# Patient Record
Sex: FEMALE | Race: BLACK OR AFRICAN AMERICAN | NOT HISPANIC OR LATINO | Employment: FULL TIME | URBAN - METROPOLITAN AREA
[De-identification: names, ages, dates, MRNs, and addresses within clinical notes are randomized per-mention and may not be internally consistent; named-entity substitution may affect disease eponyms.]

---

## 2023-11-02 ENCOUNTER — OFFICE VISIT (OUTPATIENT)
Age: 43
End: 2023-11-02
Payer: COMMERCIAL

## 2023-11-02 VITALS — RESPIRATION RATE: 18 BRPM | HEART RATE: 84 BPM | OXYGEN SATURATION: 98 % | WEIGHT: 293 LBS | TEMPERATURE: 99.6 F

## 2023-11-02 DIAGNOSIS — R05.9 COUGH, UNSPECIFIED TYPE: Primary | ICD-10-CM

## 2023-11-02 DIAGNOSIS — J06.9 ACUTE URI: ICD-10-CM

## 2023-11-02 LAB
SARS-COV-2 AG UPPER RESP QL IA: NEGATIVE
VALID CONTROL: NORMAL

## 2023-11-02 PROCEDURE — 87811 SARS-COV-2 COVID19 W/OPTIC: CPT | Performed by: PHYSICIAN ASSISTANT

## 2023-11-02 PROCEDURE — 99204 OFFICE O/P NEW MOD 45 MIN: CPT | Performed by: PHYSICIAN ASSISTANT

## 2023-11-02 RX ORDER — SPIRONOLACTONE 25 MG/1
25 TABLET ORAL DAILY
COMMUNITY
Start: 2023-09-25 | End: 2024-09-24

## 2023-11-02 RX ORDER — FUROSEMIDE 40 MG/1
40 TABLET ORAL DAILY
COMMUNITY
Start: 2023-09-25 | End: 2024-09-24

## 2023-11-02 RX ORDER — ATORVASTATIN CALCIUM 20 MG/1
20 TABLET, FILM COATED ORAL DAILY
COMMUNITY

## 2023-11-02 RX ORDER — FLUTICASONE PROPIONATE 50 MCG
2 SPRAY, SUSPENSION (ML) NASAL DAILY
Qty: 16 G | Refills: 0 | Status: SHIPPED | OUTPATIENT
Start: 2023-11-02

## 2023-11-02 RX ORDER — CARVEDILOL 25 MG/1
25 TABLET ORAL
COMMUNITY
Start: 2023-09-25 | End: 2024-09-24

## 2023-11-02 RX ORDER — SACUBITRIL AND VALSARTAN 49; 51 MG/1; MG/1
TABLET, FILM COATED ORAL
COMMUNITY
Start: 2023-09-25

## 2023-11-02 RX ORDER — LEVOTHYROXINE SODIUM 13 UG/1
CAPSULE ORAL
COMMUNITY

## 2023-11-02 NOTE — PATIENT INSTRUCTIONS
Acute Cough   WHAT YOU NEED TO KNOW:   An acute cough can last up to 3 weeks. Common causes of an acute cough include a cold, allergies, or a lung infection. DISCHARGE INSTRUCTIONS:   Return to the emergency department if:   You have trouble breathing or feel short of breath. You cough up blood, or you see blood in your mucus. You faint or feel weak or dizzy. You have chest pain when you cough or take a deep breath. You have new wheezing. Contact your healthcare provider if:   You have a fever. Your cough lasts longer than 4 weeks. Your symptoms do not improve with treatment. You have questions or concerns about your condition or care. Medicines:   Medicines  may be needed to stop the cough, decrease swelling in your airways, or help open your airways. Medicine may also be given to help you cough up mucus. Ask your healthcare provider what over-the-counter medicines you can take. If you have an infection caused by bacteria, you may need antibiotics. Take your medicine as directed. Contact your healthcare provider if you think your medicine is not helping or if you have side effects. Tell your provider if you are allergic to any medicine. Keep a list of the medicines, vitamins, and herbs you take. Include the amounts, and when and why you take them. Bring the list or the pill bottles to follow-up visits. Carry your medicine list with you in case of an emergency. Manage your symptoms:   Do not smoke and stay away from others who smoke. Nicotine and other chemicals in cigarettes and cigars can cause lung damage and make your cough worse. Ask your healthcare provider for information if you currently smoke and need help to quit. E-cigarettes or smokeless tobacco still contain nicotine. Talk to your healthcare provider before you use these products. Drink extra liquids as directed. Liquids will help thin and loosen mucus so you can cough it up.  Liquids will also help prevent dehydration. Examples of good liquids to drink include water, fruit juice, and broth. Do not drink liquids that contain caffeine. Caffeine can increase your risk for dehydration. Ask your healthcare provider how much liquid to drink each day. Rest as directed. Do not do activities that make your cough worse, such as exercise. Use a humidifier or vaporizer. Use a cool mist humidifier or a vaporizer to increase air moisture in your home. This may make it easier for you to breathe and help decrease your cough. Eat 2 to 5 mL of honey 2 times each day. Honey can help thin mucus and decrease your cough. Use cough drops or lozenges. These can help decrease throat irritation and your cough. Follow up with your healthcare provider as directed:  Write down your questions so you remember to ask them during your visits. © Copyright Angelica Ku 2023 Information is for End User's use only and may not be sold, redistributed or otherwise used for commercial purposes. The above information is an  only. It is not intended as medical advice for individual conditions or treatments. Talk to your doctor, nurse or pharmacist before following any medical regimen to see if it is safe and effective for you.

## 2023-11-02 NOTE — PROGRESS NOTES
[facility]        NAME: Ingris Bustillo is a 37 y.o. female  : 1980    MRN: [id number]  DATE: 2023  TIME: 12:53 PM    Assessment and Plan   Cough, unspecified type [R05.9]  1. Cough, unspecified type  Poct Covid 19 Rapid Antigen Test    fluticasone (FLONASE) 50 mcg/act nasal spray      2. Acute URI              Patient Instructions       Follow up with PCP in 3-5 days. Proceed to  ER if symptoms worsen. Chief Complaint     Chief Complaint   Patient presents with    Cold Like Symptoms     Pt states she has had right sided earache, coughing, sneezing, and sore throat for 3 days. Denies fevers. History of Present Illness       Cough  This is a new problem. The current episode started in the past 7 days. The problem has been gradually worsening. The cough is Productive of sputum. Associated symptoms include ear pain, headaches, myalgias, postnasal drip and rhinorrhea. Pertinent negatives include no chest pain, chills, fever, nasal congestion or shortness of breath. Exacerbated by: worse in the morning. Risk factors for lung disease include travel. She has tried OTC cough suppressant and rest for the symptoms. The treatment provided no relief. Her past medical history is significant for environmental allergies. Review of Systems   Review of Systems   Constitutional:  Positive for fatigue. Negative for activity change, appetite change, chills and fever. HENT:  Positive for ear pain, postnasal drip, rhinorrhea and sneezing. Negative for congestion, sinus pressure and sinus pain. Respiratory:  Positive for cough. Negative for shortness of breath. Cardiovascular:  Negative for chest pain. Gastrointestinal:  Negative for diarrhea, nausea and vomiting. Musculoskeletal:  Positive for myalgias. Allergic/Immunologic: Positive for environmental allergies. Neurological:  Positive for headaches. Negative for dizziness.          Current Medications       Current Outpatient Medications:     atorvastatin (LIPITOR) 20 mg tablet, Take 20 mg by mouth daily, Disp: , Rfl:     carvedilol (COREG) 25 mg tablet, Take 25 mg by mouth, Disp: , Rfl:     cyanocobalamin (VITAMIN B-12) 100 MCG tablet, Take 100 mcg by mouth daily, Disp: , Rfl:     dapagliflozin (Farxiga) 10 MG tablet, Take 10 mg by mouth daily, Disp: , Rfl:     Entresto 49-51 MG TABS, Take 1 tablet at bedtime  (49-51 mg total). , Disp: , Rfl:     fluticasone (FLONASE) 50 mcg/act nasal spray, 2 sprays into each nostril daily, Disp: 16 g, Rfl: 0    furosemide (LASIX) 40 mg tablet, Take 40 mg by mouth daily, Disp: , Rfl:     Levothyroxine Sodium 150 MCG CAPS, 150 MCG , Oral, daily ,Sunday as directed, Disp: , Rfl:     metFORMIN (GLUCOPHAGE) 1000 MG tablet, Take 1,000 mg by mouth, Disp: , Rfl:     Methylcobalamin 1 MG CHEW, , Disp: , Rfl:     sacubitril-valsartan (ENTRESTO) 24-26 MG TABS, Take 1 tablet by mouth 2 (two) times a day, Disp: , Rfl:     spironolactone (ALDACTONE) 25 mg tablet, Take 25 mg by mouth daily, Disp: , Rfl:     Current Allergies     Allergies as of 11/02/2023 - Reviewed 11/02/2023   Allergen Reaction Noted    Lisinopril Other (See Comments) and Swelling 04/10/2017    Pork-derived products - food allergy Other (See Comments) 04/22/2023            The following portions of the patient's history were reviewed and updated as appropriate: allergies, current medications, past family history, past medical history, past social history, past surgical history and problem list.     History reviewed. No pertinent past medical history. Past Surgical History:   Procedure Laterality Date    US GUIDED THYROID BIOPSY  9/11/2019    US GUIDED THYROID BIOPSY  9/11/2019       History reviewed. No pertinent family history. Medications have been verified.         Objective   Pulse 84   Temp 99.6 °F (37.6 °C)   Resp 18   Wt (!) 138 kg (305 lb)   SpO2 98%        Physical Exam     Physical Exam  Vitals and nursing note reviewed. Constitutional:       General: She is not in acute distress. Appearance: Normal appearance. She is obese. She is not ill-appearing. HENT:      Head: Normocephalic and atraumatic. Right Ear: Tympanic membrane, ear canal and external ear normal.      Left Ear: Tympanic membrane, ear canal and external ear normal.      Nose: Congestion and rhinorrhea present. Mouth/Throat:      Mouth: Mucous membranes are moist.      Pharynx: Posterior oropharyngeal erythema present. Eyes:      General: No scleral icterus. Extraocular Movements: Extraocular movements intact. Conjunctiva/sclera: Conjunctivae normal.      Pupils: Pupils are equal, round, and reactive to light. Cardiovascular:      Rate and Rhythm: Normal rate and regular rhythm. Pulses: Normal pulses. Heart sounds: Normal heart sounds. Pulmonary:      Effort: Pulmonary effort is normal. No respiratory distress. Breath sounds: Normal breath sounds. No wheezing, rhonchi or rales. Musculoskeletal:         General: Normal range of motion. Cervical back: Normal range of motion and neck supple. No tenderness. Lymphadenopathy:      Cervical: No cervical adenopathy. Skin:     General: Skin is warm and dry. Neurological:      General: No focal deficit present. Mental Status: She is alert and oriented to person, place, and time.       Coordination: Coordination normal.      Gait: Gait normal.   Psychiatric:         Mood and Affect: Mood normal.         Behavior: Behavior normal.

## 2025-05-22 ENCOUNTER — OFFICE VISIT (OUTPATIENT)
Dept: PULMONOLOGY | Facility: CLINIC | Age: 45
End: 2025-05-22

## 2025-05-22 VITALS
OXYGEN SATURATION: 100 % | HEART RATE: 66 BPM | TEMPERATURE: 97.5 F | SYSTOLIC BLOOD PRESSURE: 100 MMHG | HEIGHT: 71 IN | WEIGHT: 293 LBS | DIASTOLIC BLOOD PRESSURE: 66 MMHG | BODY MASS INDEX: 41.02 KG/M2

## 2025-05-22 DIAGNOSIS — E66.813 CLASS 3 SEVERE OBESITY DUE TO EXCESS CALORIES WITH SERIOUS COMORBIDITY AND BODY MASS INDEX (BMI) OF 40.0 TO 44.9 IN ADULT: ICD-10-CM

## 2025-05-22 DIAGNOSIS — I50.9 CHRONIC CONGESTIVE HEART FAILURE, UNSPECIFIED HEART FAILURE TYPE (HCC): ICD-10-CM

## 2025-05-22 DIAGNOSIS — G47.33 OSA (OBSTRUCTIVE SLEEP APNEA): Primary | ICD-10-CM

## 2025-05-22 PROBLEM — I42.9 CARDIOMYOPATHY (HCC): Status: ACTIVE | Noted: 2025-05-22

## 2025-05-22 NOTE — ASSESSMENT & PLAN NOTE
She is morbidly obese and understands need for weight reduction.  She understands that weight reduction can significantly improve her sleep apnea and other symptoms

## 2025-05-22 NOTE — ASSESSMENT & PLAN NOTE
Wt Readings from Last 3 Encounters:   05/22/25 (!) 138 kg (305 lb)   11/02/23 (!) 138 kg (305 lb)     Chronic congestive heart failure secondary to cardiomyopathy.  She has been on treatment with Entresto and Lasix.  She has a defibrillator for recurrent arrhythmias.  She follows at [facility].  On clinical examination her chest is clear to auscultation.

## 2025-05-22 NOTE — ASSESSMENT & PLAN NOTE
Sydni Mesa was diagnosed with obstructive sleep apnea more than 20 years back and has been using the CPAP regularly.  She is comfortable with the mask and pressure.  She used nasal cradles.  She was following with a pulmonologist in [place] who has retired now.  She wanted to establish care locally.  Currently she does not have any significant daytime sleepiness or tiredness.  She denied any problem driving.  On clinical examination she was morbidly obese and had oropharyngeal crowding.  Her previous sleep studies as well as compliance data were not available.  She is having problems with her current CPAP machine she is old and I have ordered a new machine for her.  I had a long discussion with her and have answered all her questions.

## 2025-05-22 NOTE — PROGRESS NOTES
Name: Sydni Mesa      : 1980      MRN: [id number]  Encounter Provider: Laurel Baxter MD  Encounter Date: 2025   Encounter department: [facility]  :  Assessment & Plan  ANASTASIYA (obstructive sleep apnea)  Sydni Mesa was diagnosed with obstructive sleep apnea more than 20 years back and has been using the CPAP regularly.  She is comfortable with the mask and pressure.  She used nasal cradles.  She was following with a pulmonologist in [place] who has retired now.  She wanted to establish care locally.  Currently she does not have any significant daytime sleepiness or tiredness.  She denied any problem driving.  On clinical examination she was morbidly obese and had oropharyngeal crowding.  Her previous sleep studies as well as compliance data were not available.  She is having problems with her current CPAP machine she is old and I have ordered a new machine for her.  I had a long discussion with her and have answered all her questions.    Class 3 severe obesity due to excess calories with serious comorbidity and body mass index (BMI) of 40.0 to 44.9 in adult  She is morbidly obese and understands need for weight reduction.  She understands that weight reduction can significantly improve her sleep apnea and other symptoms       Chronic congestive heart failure, unspecified heart failure type (HCC)  Wt Readings from Last 3 Encounters:   25 (!) 138 kg (305 lb)   23 (!) 138 kg (305 lb)     Chronic congestive heart failure secondary to cardiomyopathy.  She has been on treatment with Entresto and Lasix.  She has a defibrillator for recurrent arrhythmias.  She follows at [facility].  On clinical examination her chest is clear to auscultation.           History of Present Illness   HPI  Sydni Mesa is a 44 y.o. female with past medical history of obstructive sleep apnea cardiomyopathy cardiac arrhythmias morbid obesity and chronic  congestive heart failure who has come to the office for the first time to establish care.  She used to live in [place] and has moved over to [place] now.  She was diagnosed with obstructive sleep apnea more than 20 years back and has been using the CPAP regularly.  She is comfortable with the mask and pressure.  She uses nasal cradles.  She was following with a pulmonologist in [place] who has retired now.  She wanted to establish care locally.  Currently she does not have any significant daytime sleepiness or tiredness.  She denied any problem driving.  She does not smoke consumes alcohol only socially.  She denied any significant shortness of breath cough or phlegm or wheeze or chest pain.  She had a history of allergies.  She is very obese and understands the need for weight reduction.  She also has diabetes and uses metformin and Farxiga combination.  She is also on Entresto and Lasix for her chronic congestive heart failure/cardiomyopathy.  She has a defibrillator for recurrent arrhythmias.  She follows with [facility].  She stated that messages come that the life of the motor and the current CPAP machine has  and she should seek a new machine.  Her previous sleep studies were not available as well as her compliance data.  She seemed very motivated to continue on CPAP.  She has been not changing the accessories that frequently.      Review of Systems   Constitutional:  Negative for appetite change, chills, fatigue, fever and unexpected weight change.   HENT:  Positive for rhinorrhea. Negative for hearing loss, sneezing, sore throat and trouble swallowing.    Respiratory:  Negative for apnea, cough, choking, shortness of breath and wheezing.    Cardiovascular:  Negative for chest pain, palpitations and leg swelling.   Gastrointestinal:  Negative for abdominal pain, constipation, diarrhea, nausea and vomiting.   Genitourinary:  Negative for dysuria, frequency and urgency.  "  Musculoskeletal:  Negative for arthralgias, back pain and gait problem.   Skin:  Negative for rash.   Allergic/Immunologic: Positive for environmental allergies.   Neurological:  Negative for dizziness, seizures, syncope, light-headedness and headaches.   Psychiatric/Behavioral:  Negative for agitation, confusion and sleep disturbance. The patient is not nervous/anxious.           Objective   /66 (BP Location: Left arm, Patient Position: Sitting, Cuff Size: Standard)   Pulse 66   Temp 97.5 °F (36.4 °C) (Tympanic)   Ht 5' 11\" (1.803 m)   Wt (!) 138 kg (305 lb)   SpO2 100%   BMI 42.54 kg/m²      Physical Exam  Vitals reviewed.   Constitutional:       General: She is not in acute distress.     Appearance: She is obese. She is not ill-appearing, toxic-appearing or diaphoretic.   HENT:      Head: Normocephalic.      Nose: Rhinorrhea present.      Mouth/Throat:      Mouth: Mucous membranes are moist.      Comments: Oropharyngeal crowding    Eyes:      General: No scleral icterus.     Conjunctiva/sclera: Conjunctivae normal.       Cardiovascular:      Rate and Rhythm: Normal rate and regular rhythm.      Heart sounds: Normal heart sounds. No murmur heard.  Pulmonary:      Effort: Pulmonary effort is normal. No respiratory distress.      Breath sounds: Normal breath sounds. No stridor. No wheezing, rhonchi or rales.   Chest:      Chest wall: No tenderness.   Abdominal:      Palpations: Abdomen is soft.      Tenderness: There is no abdominal tenderness. There is no guarding.     Musculoskeletal:      Cervical back: Neck supple. No rigidity.      Right lower leg: No edema.      Left lower leg: No edema.   Lymphadenopathy:      Cervical: No cervical adenopathy.     Skin:     Coloration: Skin is not jaundiced or pale.      Findings: No rash.     Neurological:      Mental Status: She is alert and oriented to person, place, and time.      Gait: Gait normal.     Psychiatric:         Mood and Affect: Mood normal.    "      Behavior: Behavior normal.         Thought Content: Thought content normal.         Judgment: Judgment normal.

## 2025-06-27 ENCOUNTER — TELEPHONE (OUTPATIENT)
Age: 45
End: 2025-06-27

## 2025-06-27 NOTE — TELEPHONE ENCOUNTER
Patient calling as she has not heard anything back from office in regards to her new cpap machine. Advised order was placed and faxed to [facility] 5/22, to which she stated she is no longer in [place] so we should not have sent it there. She would like for office to place order to a local company, ad she will call office back if she hears from [facility]

## 2025-06-27 NOTE — TELEPHONE ENCOUNTER
Patient calling back, she states she spoke with [facility] and they have not received any order, and they cannot fill it since she lives in [place] now. Advised I sent request to providers to send to a local DME company for her, she would like a call back with company name and number to which its sent to so she can keep track

## 2025-07-02 LAB

## 2025-07-07 NOTE — TELEPHONE ENCOUNTER
CPAP order placed by [facility] via [employer] 7/2/2025. It is pending waiting to receive a copy of sleep study test. Called the previous Pulmonary office to fax a copy 3 times. I never receive it.

## 2025-07-07 NOTE — TELEPHONE ENCOUNTER
[facility] called in regards to patient sleep study. [facility] states that her office never had a sleep study for this patient. Please advise    [facility] [phone]

## 2025-07-14 LAB

## 2025-08-04 DIAGNOSIS — G47.33 OSA (OBSTRUCTIVE SLEEP APNEA): Primary | ICD-10-CM

## 2025-08-04 DIAGNOSIS — I50.22 CHRONIC SYSTOLIC CONGESTIVE HEART FAILURE (HCC): ICD-10-CM

## 2025-08-04 DIAGNOSIS — I42.9 CARDIOMYOPATHY, UNSPECIFIED TYPE (HCC): ICD-10-CM
